# Patient Record
Sex: FEMALE | Race: WHITE
[De-identification: names, ages, dates, MRNs, and addresses within clinical notes are randomized per-mention and may not be internally consistent; named-entity substitution may affect disease eponyms.]

---

## 2020-07-10 ENCOUNTER — HOSPITAL ENCOUNTER (EMERGENCY)
Dept: HOSPITAL 11 - JP.ED | Age: 65
Discharge: HOME | End: 2020-07-10
Payer: COMMERCIAL
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
## 2020-07-10 DIAGNOSIS — E78.00: ICD-10-CM

## 2020-07-10 DIAGNOSIS — S92.511A: Primary | ICD-10-CM

## 2020-07-10 DIAGNOSIS — Z79.899: ICD-10-CM

## 2020-07-10 DIAGNOSIS — I10: ICD-10-CM

## 2020-07-10 PROCEDURE — 99283 EMERGENCY DEPT VISIT LOW MDM: CPT

## 2020-07-10 PROCEDURE — 28660 TREAT TOE DISLOCATION: CPT

## 2020-07-10 PROCEDURE — 73660 X-RAY EXAM OF TOE(S): CPT

## 2020-07-10 PROCEDURE — 28515 TREATMENT OF TOE FRACTURE: CPT
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
## 2020-07-14 NOTE — CR
Toes Fifth Digit Rt T9

 

CLINICAL HISTORY: Injury, deformity

 

FINDINGS: Patient has a moderate angulated fracture at the fifth proximal

phalanx. There appears to be previous fusion of the PIP joint

 

IMPRESSION: Angulated fracture fifth proximal phalanx